# Patient Record
Sex: MALE | Race: OTHER | HISPANIC OR LATINO | URBAN - METROPOLITAN AREA
[De-identification: names, ages, dates, MRNs, and addresses within clinical notes are randomized per-mention and may not be internally consistent; named-entity substitution may affect disease eponyms.]

---

## 2020-06-17 ENCOUNTER — TELEPHONE ENCOUNTER (OUTPATIENT)
Dept: URBAN - METROPOLITAN AREA CLINIC 40 | Facility: CLINIC | Age: 55
End: 2020-06-17

## 2021-02-02 ENCOUNTER — OFFICE VISIT (OUTPATIENT)
Dept: URBAN - METROPOLITAN AREA TELEHEALTH 2 | Facility: TELEHEALTH | Age: 56
End: 2021-02-02
Payer: SELF-PAY

## 2021-02-02 DIAGNOSIS — K52.832 LYMPHOCYTIC COLITIS: ICD-10-CM

## 2021-02-02 DIAGNOSIS — Z80.0 FAMILY HISTORY OF COLON CANCER: ICD-10-CM

## 2021-02-02 PROCEDURE — 99213 OFFICE O/P EST LOW 20 MIN: CPT | Performed by: INTERNAL MEDICINE

## 2021-02-02 PROCEDURE — G8482 FLU IMMUNIZE ORDER/ADMIN: HCPCS | Performed by: INTERNAL MEDICINE

## 2021-02-02 PROCEDURE — G9903 PT SCRN TBCO ID AS NON USER: HCPCS | Performed by: INTERNAL MEDICINE

## 2021-02-02 PROCEDURE — G8420 CALC BMI NORM PARAMETERS: HCPCS | Performed by: INTERNAL MEDICINE

## 2021-02-02 PROCEDURE — G8427 DOCREV CUR MEDS BY ELIG CLIN: HCPCS | Performed by: INTERNAL MEDICINE

## 2021-02-02 PROCEDURE — 3017F COLORECTAL CA SCREEN DOC REV: CPT | Performed by: INTERNAL MEDICINE

## 2021-02-02 PROCEDURE — 1036F TOBACCO NON-USER: CPT | Performed by: INTERNAL MEDICINE

## 2021-02-02 NOTE — HPI-TODAY'S VISIT:
Patient has a history of lymphocytic colitis and diarrhea.  He underwent colonoscopy on 6/26/2008 which was within normal limits, random biopsies were consistent with lymphocytic colitis.  He underwent colonoscopy on 11/21/2018 which was within normal limits, with an excellent prep.Patient returns for follow-up on 2/2/21.  He had diarrhea this past summer, which responded to Colestid. Now, his stools are normal, and he denies abd pain, bleeding, or weight loss. His recent labwork was WNL, except for elevated cholesterol. His brother has been diagnosed with colon cancer, and he wishes to be scheduled for a colonoscopy later this year.

## 2021-08-03 ENCOUNTER — LAB OUTSIDE AN ENCOUNTER (OUTPATIENT)
Dept: URBAN - METROPOLITAN AREA TELEHEALTH 2 | Facility: TELEHEALTH | Age: 56
End: 2021-08-03

## 2021-08-03 ENCOUNTER — OFFICE VISIT (OUTPATIENT)
Dept: URBAN - METROPOLITAN AREA TELEHEALTH 2 | Facility: TELEHEALTH | Age: 56
End: 2021-08-03
Payer: SELF-PAY

## 2021-08-03 DIAGNOSIS — K52.832 LYMPHOCYTIC COLITIS: ICD-10-CM

## 2021-08-03 DIAGNOSIS — Z80.0 FAMILY HISTORY OF COLON CANCER: ICD-10-CM

## 2021-08-03 PROCEDURE — 99213 OFFICE O/P EST LOW 20 MIN: CPT | Performed by: INTERNAL MEDICINE

## 2021-08-03 RX ORDER — COLESTIPOL HYDROCHLORIDE 1 G/1
AS DIRECTED TABLET ORAL ONCE A DAY
Qty: 90 | Refills: 3 | OUTPATIENT
Start: 2021-08-03

## 2021-08-03 NOTE — HPI-TODAY'S VISIT:
Patient has a history of lymphocytic colitis and diarrhea.  He underwent colonoscopy on 6/26/2008 which was within normal limits, random biopsies were consistent with lymphocytic colitis.  He underwent colonoscopy on 11/21/2018 which was within normal limits, with an excellent prep.Patient returns for follow-up on 2/2/21.  He had diarrhea this past summer, which responded to Colestid. Now, his stools are normal, and he denies abd pain, bleeding, or weight loss. His recent labwork was WNL, except for elevated cholesterol. His brother has been diagnosed with colon cancer, and he wishes to be scheduled for a colonoscopy later this year. Patient returns for follow-up on 8/3/2021.  Unfortunately his brother passed away earlier this morning from colon cancer.  Overall patient has been feeling well, with his bowel movements essentially normal he had noticed that after drinking coffee or eating late at night he would experience some mild lower abdominal discomfort.  He has been off his once daily Colestid for several weeks, which may be contributing to this discomfort.  He is eager to schedule his surveillance colonoscopy due to the family history of colon cancer.

## 2021-08-16 ENCOUNTER — OFFICE VISIT (OUTPATIENT)
Dept: URBAN - METROPOLITAN AREA SURGERY CENTER 30 | Facility: SURGERY CENTER | Age: 56
End: 2021-08-16
Payer: SELF-PAY

## 2021-08-16 ENCOUNTER — CLAIMS CREATED FROM THE CLAIM WINDOW (OUTPATIENT)
Dept: URBAN - METROPOLITAN AREA CLINIC 4 | Facility: CLINIC | Age: 56
End: 2021-08-16
Payer: SELF-PAY

## 2021-08-16 DIAGNOSIS — Z80.0 FAMILY HISTORY MALIGNANT NEOPLASM OF BILIARY TRACT: ICD-10-CM

## 2021-08-16 DIAGNOSIS — K52.839 MICROSCOPIC COLITIS, UNSPECIFIED: ICD-10-CM

## 2021-08-16 DIAGNOSIS — K52.832 LYMPHOCYTIC COLITIS: ICD-10-CM

## 2021-08-16 PROCEDURE — 88342 IMHCHEM/IMCYTCHM 1ST ANTB: CPT | Performed by: PATHOLOGY

## 2021-08-16 PROCEDURE — 88305 TISSUE EXAM BY PATHOLOGIST: CPT | Performed by: PATHOLOGY

## 2021-08-16 PROCEDURE — 45380 COLONOSCOPY AND BIOPSY: CPT | Performed by: INTERNAL MEDICINE

## 2021-08-16 PROCEDURE — 88313 SPECIAL STAINS GROUP 2: CPT | Performed by: PATHOLOGY

## 2021-08-16 PROCEDURE — G8907 PT DOC NO EVENTS ON DISCHARG: HCPCS | Performed by: INTERNAL MEDICINE

## 2021-09-14 ENCOUNTER — DASHBOARD ENCOUNTERS (OUTPATIENT)
Age: 56
End: 2021-09-14

## 2021-09-16 ENCOUNTER — OFFICE VISIT (OUTPATIENT)
Dept: URBAN - METROPOLITAN AREA TELEHEALTH 2 | Facility: TELEHEALTH | Age: 56
End: 2021-09-16
Payer: SELF-PAY

## 2021-09-16 DIAGNOSIS — R10.84 ABDOMINAL CRAMPING, GENERALIZED: ICD-10-CM

## 2021-09-16 DIAGNOSIS — Z80.0 FAMILY HISTORY OF COLON CANCER: ICD-10-CM

## 2021-09-16 DIAGNOSIS — K52.832 LYMPHOCYTIC COLITIS: ICD-10-CM

## 2021-09-16 PROBLEM — 43364001: Status: ACTIVE | Noted: 2021-09-16

## 2021-09-16 PROBLEM — 64226004: Status: ACTIVE | Noted: 2021-02-02

## 2021-09-16 PROBLEM — 312824007: Status: ACTIVE | Noted: 2021-02-02

## 2021-09-16 PROCEDURE — 99442 PHONE E/M BY PHYS 11-20 MIN: CPT | Performed by: INTERNAL MEDICINE

## 2021-09-16 RX ORDER — DICYCLOMINE HYDROCHLORIDE 20 MG/1
1 TABLET TABLET ORAL THREE TIMES A DAY
Qty: 270 TABLET | Refills: 3 | OUTPATIENT
Start: 2021-09-16 | End: 2022-09-11

## 2021-09-16 RX ORDER — COLESTIPOL HYDROCHLORIDE 1 G/1
AS DIRECTED TABLET ORAL ONCE A DAY
Qty: 90 | Refills: 3 | Status: ACTIVE | COMMUNITY
Start: 2021-08-03

## 2021-09-16 RX ORDER — COLESTIPOL HYDROCHLORIDE 1 G/1
AS DIRECTED TABLET ORAL ONCE A DAY
Qty: 90 | Refills: 3 | OUTPATIENT

## 2021-09-16 NOTE — HPI-TODAY'S VISIT:
Patient has a history of lymphocytic colitis and diarrhea.  He underwent colonoscopy on 6/26/2008 which was within normal limits, random biopsies were consistent with lymphocytic colitis.  He underwent colonoscopy on 11/21/2018 which was within normal limits, with an excellent prep.Patient returns for follow-up on 2/2/21.  He had diarrhea this past summer, which responded to Colestid. Now, his stools are normal, and he denies abd pain, bleeding, or weight loss. His recent labwork was WNL, except for elevated cholesterol. His brother has been diagnosed with colon cancer, and he wishes to be scheduled for a colonoscopy later this year. Patient returns for follow-up on 8/3/2021.  Unfortunately his brother passed away earlier this morning from colon cancer.  Overall patient has been feeling well, with his bowel movements essentially normal he had noticed that after drinking coffee or eating late at night he would experience some mild lower abdominal discomfort.  He has been off his once daily Colestid for several weeks, which may be contributing to this discomfort.  He is eager to schedule his surveillance colonoscopy due to the family history of colon cancer. Patient underwent surveillance colonoscopy on 8/16/2021.  Small internal hemorrhoids were seen, the exam was otherwise unremarkable.  The prep was excellent.  Random colon biopsies revealed once again the presence of lymphocytic colitis. Patient returns for follow-up on 9/16/2021.  Overall he is done well since his colonoscopy.  I reviewed the findings including once again finding lymphocytic colitis on biopsy.  Because of his strong family history of colon cancer he wants to continue close surveillance.  I agreed with this, and we will plan a repeat colonoscopy in 2 years.  He states for the past few days he has had some mild abdominal discomfort with increased gas after meals.  Overall his stools have been essentially normal, he is taking 1 Colestid daily.  I advised him that we will add dicyclomine to use as needed before meals and he may try either Gas-X or Phazyme over-the-counter.

## 2022-11-29 ENCOUNTER — ERX REFILL RESPONSE (OUTPATIENT)
Dept: URBAN - METROPOLITAN AREA CLINIC 40 | Facility: CLINIC | Age: 57
End: 2022-11-29

## 2022-11-29 RX ORDER — COLESTIPOL HYDROCHLORIDE 1 G/1
AS DIRECTED TABLET ORAL ONCE A DAY
Qty: 90 | Refills: 3 | OUTPATIENT

## 2022-11-29 RX ORDER — COLESTIPOL HYDROCHLORIDE 1 G/1
AS DIRECTED ORALLY ONCE A DAY 90 DAYS TABLET, FILM COATED ORAL
Qty: 90 TABLET | Refills: 3 | OUTPATIENT